# Patient Record
Sex: MALE
[De-identification: names, ages, dates, MRNs, and addresses within clinical notes are randomized per-mention and may not be internally consistent; named-entity substitution may affect disease eponyms.]

---

## 2024-08-30 ENCOUNTER — TRANSCRIPTION ENCOUNTER (OUTPATIENT)
Age: 79
End: 2024-08-30

## 2024-08-30 ENCOUNTER — APPOINTMENT (OUTPATIENT)
Dept: OTOLARYNGOLOGY | Facility: CLINIC | Age: 79
End: 2024-08-30

## 2024-08-30 DIAGNOSIS — J32.4 CHRONIC PANSINUSITIS: ICD-10-CM

## 2024-08-30 DIAGNOSIS — R43.0 ANOSMIA: ICD-10-CM

## 2024-08-30 PROBLEM — Z00.00 ENCOUNTER FOR PREVENTIVE HEALTH EXAMINATION: Status: ACTIVE | Noted: 2024-08-30

## 2024-08-30 PROCEDURE — 99204 OFFICE O/P NEW MOD 45 MIN: CPT | Mod: 25

## 2024-08-30 PROCEDURE — 31231 NASAL ENDOSCOPY DX: CPT

## 2024-08-30 NOTE — ASSESSMENT
[FreeTextEntry1] : 79M here for initial evaluation. Over the past 8 months or so he has had a constellation of sx including loss of olfaction, mucus, postnasal drip and nasal congestion. The congestion got so severe he was unable to breathe at all from his nose; this improved with steroids but has slowly recurred. He has since been on several rounds of steroids which reliably give temporary improvement, only for sx ot recur. During this time, he also has c/o coughing with chest tightness and is on symbicort. Any consumption of etoh makes his nasal sx much worse. CT sinus shows complete pansinus opacification w hyperdense secretions and osteitic bone throughout with opacified olfactory clefts and bilateral septal deflection. On exam, nasal endoscopy shows diffuse sinonasal mucosal edema with polypoid edema and polyps extending out of both middle and superior meati with postnasal drainage. He has pansinusitis which is chronic given history and there is a severe inflammatory burden on both imaging and endoscopy, with high chance of allergic fungal disease too given imaging. Further, there is worsening lower airway disease given uncontrolled paranasal sinus disease. He remains symptomatic despite maximal medical management. At this point, I would recommend complete sinus surgery - this would remove inflammatory debris, reestablish paranasal sinus patency, optimize delivery of safe topical medication and ultimately reset inflammatory cycle. This was reviewed at length and all questions answered. Plan for OR in the next 2-3 months or so after [place] from [place].

## 2024-08-30 NOTE — PROCEDURE
[FreeTextEntry3] : Nasal Endoscopy: diffuse sinonasal mucosal edema polypoid edema and polyps extending out of b/l MM and SM choana w mucus

## 2024-08-30 NOTE — CONSULT LETTER
[Dear  ___] : Dear  [unfilled], [Courtesy Letter:] : I had the pleasure of seeing your patient, [unfilled], in my office today. [Consult Closing:] : Thank you very much for allowing me to participate in the care of this patient.  If you have any questions, please do not hesitate to contact me. [Sincerely,] : Sincerely, [FreeTextEntry3] : Cooper Jin MD Department of Otolaryngology, Head and Neck Surgery [DrDena  ___] : Dr. BELLO

## 2024-08-30 NOTE — HISTORY OF PRESENT ILLNESS
[de-identified] : 79M here for initial evaluation.  Over the past 8 months or so he has had a constellation of sx including loss of olfaction, mucus, postnasal drip and nasal congestion. The congestion got so severe he was unable to breathe at all from his nose; this improved with steroids but has slowly recurred. He has since been on several rounds of steroids which reliably give temporary improvement, only for sx ot recur. During this time, he also has c/o coughing with chest tightness and is on symbicort. Any consumption of etoh makes his nasal sx much worse. CXR is normal.  CT Sinus 7/31/24 (I reviewed): -complete pansinus opacification w hyperdense secretion in spheroids and frontals -variant type 3/4 frontal cells and opacified olfactory clefts -bilateral septal deflection  ROS otherwise unremarkable.

## 2024-11-08 NOTE — ASU PATIENT PROFILE, ADULT - FALL HARM RISK - FALLEN IN PAST
OPERATION DATE:  12/05/2019    PREOPERATIVE DIAGNOSIS:    1. Right shoulder questionable SLAP tear.   2. Impingement.    POSTOPERATIVE DIAGNOSIS:        SURGEON:  Ray Hogan MD    POSTOPERATIVE DIAGNOSES:  1. Posterior labral tear with stable insertion.  2. Early capsulitis.  3. AC arthritis.  4. Impingement.    PROCEDURE:  1. Arthroscopic debridement posterior superior labral tear with intact biceps  origin.   2. Capsular release early arthrofibrosis.  3. Subacromial bursectomy/decompression.  4. Distal clavicle excision.    FIRST ASSISTANT:  Bettie Perdomo PA-C.    ANESTHESIA:  General plus regional.    ESTIMATED BLOOD LOSS:  Minimal.    COMPLICATIONS:  None.    BRIEF HISTORY:  The patient is a 49-year-old male with a right shoulder injury.  MRI showed a  questionable labral pathology.  The patient with persistent pain for several  months post injury and despite therapy and conservative management symptoms  persisted.  Based on MRI findings and severity of symptoms, elected to  proceed with shoulder arthroscopy.  I reviewed with him his options of  treatment including continued conservative versus operative care.  I gave him  no guarantees as to results.  I outlined with him the typical course of  recovery.  The patient understands the need for biceps tenodesis procedure,  its indications, as well as other potential procedures based on what is noted  at the time of surgery.  He does understand the use of orthopedic implants  and the necessity for repair.     PROCEDURE IN DETAIL:  Right upper extremity was marked in preop holding area.  Regional anesthesia  was undertaken.  He was returned to the operating room, placed supine on the  operative table.  General endotracheal anesthesia was administered.  Time-out  was called.  We identified the appropriate extremity.  It was prepped and  draped in routine sterile fashion for right shoulder arthroscopy.  Exam under  anesthesia showed slight decreased internal  rotation, external rotation  consistent with early arthrofibrosis.  No evidence of instability.  After  prepping and draping, we insufflated the shoulder with 40 cc of saline.  A  small incision made, scope inserted.  Diagnostic arthroscopy undertaken.  The  following findings were noted:   1. Early arthrofibrosis with extensive capsulitis, thickening.  2. Intact biceps origin.  3. Posterior labral tear with intact attachment.  4. Intact and normal rotator cuff throughout.  5. Subacromial bursitis impingement.  6. AC arthritis.     At this point, we addressed the intracapsular portion first.  Particular  attention to the biceps origin showed to have __________ congruity with only  type 1 SLAP tear extending into posterior labral tear that did not have  glenoid attachment.  We debrided the undersurface of the labrum as well as  around the biceps to show its integrity and normal subscap biceps interval.  Once addressed, we then switched viewing from posterior to anterior to  characterize the posterior component of the tear.  Here, we did not find  labral detachment, just labral tearing, which was debrided back to stable  margin.  Cautery device set was then used to debride the capsule and release  the capsule.  This was done throughout its course.  Once addressed, we then  passed the scope in the subacromial space.     Here, we identified extensive bursitis, CA ligament hypertrophy as well as AC  arthritis.  Standard subacromial decompression was carried out removing a  portion of the anterior lateral acromion and then turning our attention to  the distal clavicle.     Here, we resected the end of the clavicle taking 8 mm distal clavicle  maintaining the posterior superior capsular structures and the CC ligaments.  Once addressed we removed all fluid from the shoulder joint.  Wounds were  closed with interrupted Monocryl suture.  Sterile dressings were applied.  The patient returned supine, extubated to recovery room  in stable condition.  All needle and sponge counts were correct.  I was present for the entire  procedure.           DRG/MedQ  Dictation Date:  12/05/2019  Transcribed Date:  12/06/2019 00:30:53  Job #:  [id number]       No

## 2024-11-08 NOTE — ASU PATIENT PROFILE, ADULT - NSICDXPASTSURGICALHX_GEN_ALL_CORE_FT
PAST SURGICAL HISTORY:  No significant past surgical history PAST SURGICAL HISTORY:  H/O eye surgery cataract  eye removal both eyes

## 2024-11-08 NOTE — ASU PATIENT PROFILE, ADULT - TOBACCO USE
Received [employer] order form for medical supplies. Faxed, received confirmation.   
Never smoker

## 2024-11-08 NOTE — ASU PATIENT PROFILE, ADULT - REASON FOR ADMISSION, PROFILE
Conjuntivae and eyelids appear normal, Sclerae : White without injection
SEPTOPLASTY AND TURBINOPLASTY BL SPHENOIDOTOMY W REMOVAL OF TISSUE, BL ETHMOIDECTOMY TOTAL INCLUDING FRONTAL SINUS OTOMY, BL MAXILLARY ANTROSTOMY WITH REMOVAL OF TISSUSE W NAVIGATION

## 2024-11-08 NOTE — ASU PATIENT PROFILE, ADULT - NSICDXPASTMEDICALHX_GEN_ALL_CORE_FT
PAST MEDICAL HISTORY:  No pertinent past medical history PAST MEDICAL HISTORY:  H/O sinusitis      PAST MEDICAL HISTORY:  H/O sinusitis     Nasal congestion

## 2024-11-08 NOTE — ASU PATIENT PROFILE, ADULT - NS PREOP UNDERSTANDS INFO
Spoke to patient to be NPO/No solid foods after  2200 pm, allow to drink water or apple juice  till 12Mn . dress comfortable,  no lotion, no jewelry,  Bring ID photo and insurance cards,  escort arranged, address and telephone given/yes

## 2024-11-11 ENCOUNTER — TRANSCRIPTION ENCOUNTER (OUTPATIENT)
Age: 79
End: 2024-11-11

## 2024-11-11 ENCOUNTER — OUTPATIENT (OUTPATIENT)
Dept: OUTPATIENT SERVICES | Facility: HOSPITAL | Age: 79
LOS: 1 days | Discharge: HOME | End: 2024-11-11
Payer: MEDICARE

## 2024-11-11 ENCOUNTER — APPOINTMENT (OUTPATIENT)
Dept: OTOLARYNGOLOGY | Facility: HOSPITAL | Age: 79
End: 2024-11-11

## 2024-11-11 ENCOUNTER — RESULT REVIEW (OUTPATIENT)
Age: 79
End: 2024-11-11

## 2024-11-11 VITALS
RESPIRATION RATE: 16 BRPM | OXYGEN SATURATION: 98 % | SYSTOLIC BLOOD PRESSURE: 155 MMHG | HEIGHT: 71 IN | WEIGHT: 162.48 LBS | HEART RATE: 63 BPM | DIASTOLIC BLOOD PRESSURE: 84 MMHG | TEMPERATURE: 99 F

## 2024-11-11 VITALS
HEART RATE: 81 BPM | DIASTOLIC BLOOD PRESSURE: 63 MMHG | SYSTOLIC BLOOD PRESSURE: 114 MMHG | OXYGEN SATURATION: 95 % | RESPIRATION RATE: 15 BRPM

## 2024-11-11 DIAGNOSIS — Z98.890 OTHER SPECIFIED POSTPROCEDURAL STATES: Chronic | ICD-10-CM

## 2024-11-11 PROCEDURE — 61782 SCAN PROC CRANIAL EXTRA: CPT

## 2024-11-11 PROCEDURE — 31240 NSL/SNS NDSC CNCH BULL RESCJ: CPT | Mod: 50

## 2024-11-11 PROCEDURE — 31267 ENDOSCOPY MAXILLARY SINUS: CPT | Mod: 50

## 2024-11-11 PROCEDURE — 30130 EXCISE INFERIOR TURBINATE: CPT | Mod: 50

## 2024-11-11 PROCEDURE — 88305 TISSUE EXAM BY PATHOLOGIST: CPT | Mod: 26

## 2024-11-11 PROCEDURE — 88304 TISSUE EXAM BY PATHOLOGIST: CPT | Mod: 26

## 2024-11-11 PROCEDURE — 31288 NASAL/SINUS ENDOSCOPY SURG: CPT | Mod: 50

## 2024-11-11 PROCEDURE — 31253 NSL/SINS NDSC TOTAL: CPT | Mod: 50

## 2024-11-11 PROCEDURE — 88311 DECALCIFY TISSUE: CPT | Mod: 26

## 2024-11-11 DEVICE — STENT DRUG ELUTING SINUS BIO ABSORB INTERSECT ENT PROPEL 23MM: Type: IMPLANTABLE DEVICE | Status: FUNCTIONAL

## 2024-11-11 DEVICE — SYS CATH BALLOON RELIEVA SINUS: Type: IMPLANTABLE DEVICE | Status: FUNCTIONAL

## 2024-11-11 DEVICE — MEDTRONIC SILICONE SHEET 0.25MM: Type: IMPLANTABLE DEVICE | Status: FUNCTIONAL

## 2024-11-11 DEVICE — STENT SINUS DRUG ELUDING PROPEL CONTOUR: Type: IMPLANTABLE DEVICE | Status: FUNCTIONAL

## 2024-11-11 RX ORDER — APREPITANT 40 MG/1
40 CAPSULE ORAL ONCE
Refills: 0 | Status: COMPLETED | OUTPATIENT
Start: 2024-11-11 | End: 2024-11-11

## 2024-11-11 RX ORDER — FENTANYL CITRAT/DEXTROSE 5%/PF 1250MCG/50
25 PATIENT CONTROLLED ANALGESIA SYRINGE INTRAVENOUS
Refills: 0 | Status: DISCONTINUED | OUTPATIENT
Start: 2024-11-11 | End: 2024-11-11

## 2024-11-11 RX ORDER — SODIUM CHLORIDE 0.65 %
0.65 AEROSOL, SPRAY (ML) NASAL
Qty: 2 | Refills: 5 | Status: ACTIVE | COMMUNITY
Start: 2024-11-11 | End: 1900-01-01

## 2024-11-11 RX ORDER — ACETAMINOPHEN 500 MG
1000 TABLET ORAL ONCE
Refills: 0 | Status: COMPLETED | OUTPATIENT
Start: 2024-11-11 | End: 2024-11-11

## 2024-11-11 RX ORDER — OXYCODONE AND ACETAMINOPHEN 5; 325 MG/1; MG/1
5-325 TABLET ORAL
Qty: 25 | Refills: 0 | Status: ACTIVE | COMMUNITY
Start: 2024-11-11 | End: 1900-01-01

## 2024-11-11 RX ORDER — PREDNISONE 10 MG/1
10 TABLET ORAL
Qty: 28 | Refills: 0 | Status: ACTIVE | COMMUNITY
Start: 2024-11-11 | End: 1900-01-01

## 2024-11-11 RX ORDER — ONDANSETRON HYDROCHLORIDE 2 MG/ML
4 INJECTION, SOLUTION INTRAMUSCULAR; INTRAVENOUS ONCE
Refills: 0 | Status: DISCONTINUED | OUTPATIENT
Start: 2024-11-11 | End: 2024-11-11

## 2024-11-11 RX ORDER — EPINEPHRINE NASAL SOLUTION 1 MG/ML
1 SOLUTION NASAL ONCE
Refills: 0 | Status: DISCONTINUED | OUTPATIENT
Start: 2024-11-11 | End: 2024-11-11

## 2024-11-11 RX ADMIN — APREPITANT 40 MILLIGRAM(S): 40 CAPSULE ORAL at 06:25

## 2024-11-11 RX ADMIN — Medication 1000 MILLIGRAM(S): at 06:25

## 2024-11-11 NOTE — ASU DISCHARGE PLAN (ADULT/PEDIATRIC) - FINANCIAL ASSISTANCE
[facility] provides services at a reduced cost to those who are determined to be eligible through [facility]’s financial assistance program. Information regarding [facility]’s financial assistance program can be found by going to [email or web address] or by calling [phone].

## 2024-11-11 NOTE — ASU DISCHARGE PLAN (ADULT/PEDIATRIC) - ASU DC SPECIAL INSTRUCTIONSFT
Call office to confirm f/u with Dr. Jin. Call office to confirm f/u with Dr. Jin.    Please take all medications as prescribed.    For pain you may take Tylenol 650mg every 6 hours as needed.  Do not exceed 4g/24hrs.  If your pain is not controlled with Tylenol you make take 1 tab percocet as needed for pain.

## 2024-11-11 NOTE — ASU DISCHARGE PLAN (ADULT/PEDIATRIC) - CARE PROVIDER_API CALL
Cooper Jin  Otolaryngology  [address] [place] [address], [address]  [place] [address]  Phone: [phone]  Fax: [phone]  Follow Up Time:

## 2024-11-13 PROBLEM — R09.81 NASAL CONGESTION: Chronic | Status: ACTIVE | Noted: 2024-11-11

## 2024-11-13 PROBLEM — Z87.09 PERSONAL HISTORY OF OTHER DISEASES OF THE RESPIRATORY SYSTEM: Chronic | Status: ACTIVE | Noted: 2024-11-08

## 2024-11-19 ENCOUNTER — APPOINTMENT (OUTPATIENT)
Dept: OTOLARYNGOLOGY | Facility: CLINIC | Age: 79
End: 2024-11-19
Payer: MEDICARE

## 2024-11-19 ENCOUNTER — NON-APPOINTMENT (OUTPATIENT)
Age: 79
End: 2024-11-19

## 2024-11-19 VITALS — HEIGHT: 71 IN | WEIGHT: 168 LBS | BODY MASS INDEX: 23.52 KG/M2

## 2024-11-19 DIAGNOSIS — J32.4 CHRONIC PANSINUSITIS: ICD-10-CM

## 2024-11-19 PROCEDURE — 31237 NSL/SINS NDSC SURG BX POLYPC: CPT | Mod: 58,RT

## 2024-11-19 PROCEDURE — 99024 POSTOP FOLLOW-UP VISIT: CPT

## 2024-11-19 RX ORDER — BUDESONIDE 0.5 MG/2ML
0.5 INHALANT ORAL
Qty: 6 | Refills: 3 | Status: ACTIVE | COMMUNITY
Start: 2024-11-19 | End: 1900-01-01

## 2024-11-25 LAB — EAR NOSE AND THROAT CULTURE: ABNORMAL

## 2024-12-17 ENCOUNTER — APPOINTMENT (OUTPATIENT)
Dept: OTOLARYNGOLOGY | Facility: CLINIC | Age: 79
End: 2024-12-17
Payer: MEDICARE

## 2024-12-17 DIAGNOSIS — J32.4 CHRONIC PANSINUSITIS: ICD-10-CM

## 2024-12-17 PROCEDURE — 31231 NASAL ENDOSCOPY DX: CPT | Mod: 58

## 2024-12-17 PROCEDURE — 99024 POSTOP FOLLOW-UP VISIT: CPT

## 2024-12-17 RX ORDER — SULFAMETHOXAZOLE AND TRIMETHOPRIM 800; 160 MG/1; MG/1
800-160 TABLET ORAL TWICE DAILY
Qty: 28 | Refills: 0 | Status: ACTIVE | COMMUNITY
Start: 2024-12-17 | End: 1900-01-01

## 2024-12-17 RX ORDER — BUDESONIDE 0.5 MG/2ML
0.5 INHALANT ORAL
Qty: 6 | Refills: 3 | Status: ACTIVE | COMMUNITY
Start: 2024-12-17 | End: 1900-01-01

## 2024-12-23 LAB — EAR NOSE AND THROAT CULTURE: ABNORMAL

## 2025-02-06 ENCOUNTER — APPOINTMENT (OUTPATIENT)
Dept: OTOLARYNGOLOGY | Facility: CLINIC | Age: 80
End: 2025-02-06
Payer: MEDICARE

## 2025-02-06 DIAGNOSIS — J32.4 CHRONIC PANSINUSITIS: ICD-10-CM

## 2025-02-06 DIAGNOSIS — R43.0 ANOSMIA: ICD-10-CM

## 2025-02-06 PROCEDURE — 99024 POSTOP FOLLOW-UP VISIT: CPT

## 2025-02-06 PROCEDURE — 31231 NASAL ENDOSCOPY DX: CPT | Mod: 58

## 2025-02-06 RX ORDER — AZITHROMYCIN 250 MG/1
250 TABLET, FILM COATED ORAL
Qty: 2 | Refills: 5 | Status: ACTIVE | COMMUNITY
Start: 2025-02-06 | End: 1900-01-01

## 2025-02-11 LAB — EAR NOSE AND THROAT CULTURE: ABNORMAL

## 2025-02-20 ENCOUNTER — TRANSCRIPTION ENCOUNTER (OUTPATIENT)
Age: 80
End: 2025-02-20

## 2025-04-08 ENCOUNTER — APPOINTMENT (OUTPATIENT)
Dept: OTOLARYNGOLOGY | Facility: CLINIC | Age: 80
End: 2025-04-08
Payer: MEDICARE

## 2025-04-08 VITALS — HEIGHT: 72 IN | WEIGHT: 175 LBS | BODY MASS INDEX: 23.7 KG/M2

## 2025-04-08 DIAGNOSIS — J32.4 CHRONIC PANSINUSITIS: ICD-10-CM

## 2025-04-08 DIAGNOSIS — R43.0 ANOSMIA: ICD-10-CM

## 2025-04-08 PROCEDURE — 31231 NASAL ENDOSCOPY DX: CPT

## 2025-04-08 PROCEDURE — 99213 OFFICE O/P EST LOW 20 MIN: CPT | Mod: 25

## 2025-04-14 LAB — EAR NOSE AND THROAT CULTURE: NORMAL

## 2025-06-11 ENCOUNTER — NON-APPOINTMENT (OUTPATIENT)
Age: 80
End: 2025-06-11

## 2025-06-13 ENCOUNTER — APPOINTMENT (OUTPATIENT)
Dept: OTOLARYNGOLOGY | Facility: CLINIC | Age: 80
End: 2025-06-13
Payer: MEDICARE

## 2025-06-13 VITALS — HEIGHT: 71 IN | WEIGHT: 175 LBS | BODY MASS INDEX: 24.5 KG/M2

## 2025-06-13 PROCEDURE — 99213 OFFICE O/P EST LOW 20 MIN: CPT | Mod: 25

## 2025-06-13 PROCEDURE — 31231 NASAL ENDOSCOPY DX: CPT

## (undated) DEVICE — WARMING BLANKET LOWER ADULT

## (undated) DEVICE — STAPLER SKIN VISI-STAT 35 WIDE

## (undated) DEVICE — MEDTRONIC INSTRUMENT TRACKER ENT

## (undated) DEVICE — SYR ASEPTO

## (undated) DEVICE — PETRI DISH MED 3.5"

## (undated) DEVICE — Device

## (undated) DEVICE — SYR LUER LOK 50CC

## (undated) DEVICE — PACK RHINOPLASTY

## (undated) DEVICE — SOL ANTI FOG

## (undated) DEVICE — BLADE MEDTRONIC ENT FUSION QUADCUT ROTATABLE STRAIGHT 4.3MM X 13CM

## (undated) DEVICE — GLV 6 PROTEXIS (WHITE)

## (undated) DEVICE — DRSG TELFA 3 X 8

## (undated) DEVICE — DRSG MEROCEL SPLINT SILICONE

## (undated) DEVICE — SUT PLAIN GUT 4-0 18" SC-1

## (undated) DEVICE — BLADE MEDTRONIC ENT RAD 90 DEGREE ROTATABLE 3.5MM X 11CM

## (undated) DEVICE — SUT PROLENE 2-0 30" CT-2

## (undated) DEVICE — BLADE MEDTRONIC ENT INFERIOR TURBINATE ROTATABLE STRAIGHT 2.9MM X 11CM

## (undated) DEVICE — MEDTRONIC AXIEM PATIENT TRACKER NON-INVASIVE

## (undated) DEVICE — GLV 7.5 PROTEXIS (WHITE)

## (undated) DEVICE — MARKING PEN W RULER

## (undated) DEVICE — GLV 7 PROTEXIS (WHITE)

## (undated) DEVICE — ACCLARENT SET INFLATION DEVICE

## (undated) DEVICE — DRSG 2 X 2" STERILE

## (undated) DEVICE — STRYKER MALLEABLE SUCTION MEDIUM STANDARD

## (undated) DEVICE — TUBING SUCTION NONCONDUCTIVE 6MM X 12FT

## (undated) DEVICE — ELCTR BOVIE SUCTION 8FR 6"

## (undated) DEVICE — SUCTION CATH ARGYLE WHISTLE TIP 14FR STRAIGHT PACKED

## (undated) DEVICE — VENODYNE/SCD SLEEVE CALF MEDIUM

## (undated) DEVICE — BUR MEDTRONIC ENT TAPERED DIAMOND CHOANAL ATRESIA 15 DEGREE 4MM X 13CM

## (undated) DEVICE — DRAPE MAYO STAND 23"

## (undated) DEVICE — SPONGE NEURO 0.5X3IN 20/CA STRL